# Patient Record
Sex: MALE | NOT HISPANIC OR LATINO | Employment: OTHER | URBAN - METROPOLITAN AREA
[De-identification: names, ages, dates, MRNs, and addresses within clinical notes are randomized per-mention and may not be internally consistent; named-entity substitution may affect disease eponyms.]

---

## 2018-01-11 ENCOUNTER — OUTSIDE FACILITY SERVICE (OUTPATIENT)
Dept: GASTROENTEROLOGY | Facility: CLINIC | Age: 73
End: 2018-01-11

## 2018-01-11 PROCEDURE — G0105 COLORECTAL SCRN; HI RISK IND: HCPCS | Performed by: INTERNAL MEDICINE

## 2019-01-14 ENCOUNTER — OFFICE VISIT (OUTPATIENT)
Dept: RADIATION ONCOLOGY | Facility: HOSPITAL | Age: 74
End: 2019-01-14

## 2019-01-14 ENCOUNTER — HOSPITAL ENCOUNTER (OUTPATIENT)
Dept: RADIATION ONCOLOGY | Facility: HOSPITAL | Age: 74
Setting detail: RADIATION/ONCOLOGY SERIES
Discharge: HOME | End: 2019-01-14

## 2019-01-14 VITALS
OXYGEN SATURATION: 97 % | DIASTOLIC BLOOD PRESSURE: 86 MMHG | WEIGHT: 208.6 LBS | TEMPERATURE: 98.4 F | HEART RATE: 105 BPM | SYSTOLIC BLOOD PRESSURE: 172 MMHG | RESPIRATION RATE: 20 BRPM

## 2019-01-14 DIAGNOSIS — C61 PROSTATE CANCER (HCC): Primary | ICD-10-CM

## 2019-01-14 PROCEDURE — G0463 HOSPITAL OUTPT CLINIC VISIT: HCPCS

## 2019-01-14 RX ORDER — METOPROLOL SUCCINATE 50 MG/1
50 TABLET, EXTENDED RELEASE ORAL DAILY
COMMUNITY

## 2019-01-14 RX ORDER — LEVOTHYROXINE SODIUM 0.1 MG/1
100 TABLET ORAL DAILY
COMMUNITY

## 2019-01-14 NOTE — PROGRESS NOTES
CONSULTATION NOTE      :                                                          1945  DATE OF CONSULTATION:                       2019   REQUESTING PHYSICIAN:                   Franky Villalobos MD  REASON FOR CONSULTATION:        Prostate cancer (CMS/[facility])  Staging form: Prostate, AJCC 8th Edition  - Clinical stage from 2018: Stage I (cT1c, cN0, cM0, PSA: 8.9, Grade Group: 1) - Signed by Kunal Ospina MD on 2019       BRIEF HISTORY:  The patient is a very pleasant 73 y.o. male  with recently diagnosed prostate cancer.    He presented with abnormal JANINE and elevated PSA of 8.94 ng/ml on 2018.    Prostate gland measured 38.8 cc with no suspicious areas on ultrasound.  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  He underwent staging CT abdomen pelvis which showed no obvious extra prostatic extension or pathologic adenopathy.  He did have incidental findings of left inguinal hernia, right renal stone and questionable gallstones.  He recently underwent left inguinal hernia repair and is recovering well from that procedure.  He is now interested in definitive treatment for the prostate cancer.     No Known Allergies    Social History     Socioeconomic History   • Marital status: Unknown     Spouse name: Not on file   • Number of children: Not on file   • Years of education: Not on file   • Highest education level: Not on file   Tobacco Use   • Smoking status: Former Smoker     Packs/day: 0.25     Types: Cigarettes     Last attempt to quit: 1960     Years since quittin.0   • Smokeless tobacco: Never Used   Substance and Sexual Activity   • Alcohol use: Yes     Alcohol/week: 3.0 oz     Types: 5 Cans of beer per week     Comment: 5 per week   • Drug use: No   • Sexual activity: Defer       Past Medical History:    Diagnosis Date   • Arthritis    • Chronic kidney disease    • Disease of thyroid gland    • Gout    • Hypertension    • Prostate cancer (CMS/HCC)        family history includes Heart attack in his mother; Lung cancer in his father; Prostate cancer in his father.     Past Surgical History:   Procedure Laterality Date   • CHOLECYSTECTOMY  2018   • HERNIA REPAIR  2019   • PROSTATE BIOPSY  2018   • TONSILLECTOMY          Review of Systems   Constitutional: Negative.    HENT:   Positive for hearing loss and tinnitus.    Eyes: Negative.    Respiratory: Negative.    Cardiovascular: Negative.    Gastrointestinal: Negative.    Endocrine: Negative.    Genitourinary: Negative.     Musculoskeletal: Positive for back pain.   Skin: Negative.    Neurological: Negative.    Hematological: Bruises/bleeds easily.   Psychiatric/Behavioral: The patient is nervous/anxious.          IPSS Questionnaire (AUA-7):  Over the past month…    1)  Incomplete Emptying  How often have you had a sensation of not emptying your bladder?  0 - Not at all   2)  Frequency  How often have you had to urinate less than every two hours? 1 - Less than 1 time in 5   3)  Intermittency  How often have you found you stopped and started again several times when you urinated?  0 - Not at all   4) Urgency  How often have you found it difficult to postpone urination?  0 - Not at all   5) Weak Stream  How often have you had a weak urinary stream?  0 - Not at all   6) Straining  How often have you had to push or strain to begin urination?  0 - Not at all   7) Nocturia  How many times did you typically get up at night to urinate?  1 - 1 time   Total Score:  2       Quality of life due to urinary symptoms:  If you were to spend the rest of your life with your urinary condition the way it is now, how would you feel about that? 0-Delighted   Urine Leakage (Incontinence) 0-No Leakage     Sexual Health Inventory  Current Status    1)  How do you rate your confidence that you  could achieve and keep an erection? 1-Very Low   2) When you had erections with sexual stimulation, how often were your erections hard enough for penetration (entering your partner)? 0-No sexual activity   3)  During sexual intercourse, how often were you able to maintain your erection after you had penetrated (entered) into your partner? 0-Did not attempt intercourse   4) During sexual intercourse, how difficult was it to maintain your erection to completion of intercourse? 0-Did not attempt intercourse   5) When you attempted sexual intercourse, how often was it satisfactory to you? 0-No sexual activity   Total Score: 1       Bowel Health Inventory  Current Status: 0-No problems, no rectal bleeding, no discharge, less then 5 bowel movements a day               Objective   VITAL SIGNS:   Vitals:    01/14/19 0848   BP: 172/86   Pulse: 105   Resp: 20   Temp: 98.4 °F (36.9 °C)   TempSrc: Temporal   SpO2: 97%   Weight: 94.6 kg (208 lb 9.6 oz)   PainSc: 0-No pain        Karnofsky score: 90        Physical Exam   Constitutional: He is oriented to person, place, and time. He appears well-developed and well-nourished.   HENT:   Head: Normocephalic and atraumatic.   Neck: Normal range of motion. Neck supple.   Cardiovascular: Normal rate, regular rhythm and normal heart sounds.   No murmur heard.  Pulmonary/Chest: Effort normal and breath sounds normal. He has no wheezes. He has no rales.   Abdominal: Soft. Bowel sounds are normal. He exhibits no distension. There is no hepatosplenomegaly. There is no tenderness.   Genitourinary: Rectal exam shows no external hemorrhoid, no internal hemorrhoid, no mass and no tenderness. Prostate is enlarged (30 cc, symmetricwith smooth surface but induration left mid to apex.  Seminal vesicles not palpable.). Prostate is not tender.   Musculoskeletal: Normal range of motion. He exhibits no edema or tenderness.   Lymphadenopathy:     He has no cervical adenopathy.     He has no axillary  adenopathy.        Right: No supraclavicular adenopathy present.        Left: No supraclavicular adenopathy present.   Neurological: He is alert and oriented to person, place, and time. He has normal strength. No sensory deficit.   Skin: Skin is warm and dry.   Psychiatric: He has a normal mood and affect. His behavior is normal. Judgment and thought content normal.   Nursing note and vitals reviewed.           The following portions of the patient's history were reviewed and updated as appropriate: allergies, current medications, past family history, past medical history, past social history, past surgical history and problem list.    Assessment      Prostate cancer, Akanksha's score 3+3 = 6, clinical stage I (T1c, N0, M0) with PSA 8.94 ng/ml.  He has low risk disease but half of the biopsies were involved with neoplasm.    He wishes to pursue definitive treatment.  He is recovering from recent left inguinal hernia repair but is otherwise healthy enough for treatment with life expectancy greater than 10 years.  He is interested in a nonsurgical approach.  We reviewed the radiation options of IMRT, SBRT or brachytherapy.  He would like to proceed with stereotactic body radiotherapy.  The CyberKnife treatment procedure has been reviewed in detail today.  Informed consent obtained.    RECOMMENDATIONS:  He will schedule gold seed fiducial placement with Dr. Villalobos, as soon as he has sufficiently recovered from hernia repair procedure.  He will notify us when this is complete and we will see him back approximately one week later for treatment planning CT and MRI pelvis.  The prostate gland will receive 5 fractions of 7 Gray each on the CyberKnife.    Return in about 4 weeks (around 2/11/2019) for Office Visit, Simulation, CyberKnife treatment.  Lorenzo was seen today for prostate cancer.    Diagnoses and all orders for this visit:    Prostate cancer (CMS/[facility])         Kunal Ospina MD

## 2019-02-11 ENCOUNTER — APPOINTMENT (OUTPATIENT)
Dept: RADIATION ONCOLOGY | Facility: HOSPITAL | Age: 74
End: 2019-02-11

## 2019-03-21 ENCOUNTER — TELEPHONE (OUTPATIENT)
Dept: RADIATION ONCOLOGY | Facility: HOSPITAL | Age: 74
End: 2019-03-21

## 2019-03-21 DIAGNOSIS — C61 PROSTATE CANCER (HCC): Primary | ICD-10-CM

## 2019-03-21 NOTE — TELEPHONE ENCOUNTER
Pt called and states he has marker placement scheduled but he couldn't remember when.  I contacted Dr. Villalobos's office and they confirm pt has markers scheduled on 3/25/19.   Called pt back and informed him of that appt and the following:  On 4/8/19 @ 1030 clinic  @ 1100 CT sim  @ 1130 CK sim  @ 1245 MRI for 1315 scan  Educated pt on the importance of following the ck prostate diet with gas-x 4 times per day starting 4/6/19.  Instructed pt to be NPO the morning of 4/8/19 and to perform an enema prior to leaving home.  Pt verbalized understanding.       Message sent to Tasha Renee RD.

## 2019-04-04 ENCOUNTER — DOCUMENTATION (OUTPATIENT)
Dept: NUTRITION | Facility: HOSPITAL | Age: 74
End: 2019-04-04

## 2019-04-04 NOTE — PROGRESS NOTES
[facility] Nutrition    Prostate Cancer / CK Treatment    Phone call to patient as a review of the gas elimination diet and instructions in preparation for the imaging scans scheduled for 4/8/19.  No answer;  Brief message left with instructions and request for return phone call.  Contact information provided.

## 2019-04-08 ENCOUNTER — OFFICE VISIT (OUTPATIENT)
Dept: RADIATION ONCOLOGY | Facility: HOSPITAL | Age: 74
End: 2019-04-08

## 2019-04-08 ENCOUNTER — HOSPITAL ENCOUNTER (OUTPATIENT)
Dept: RADIATION ONCOLOGY | Facility: HOSPITAL | Age: 74
Discharge: HOME | End: 2019-04-08

## 2019-04-08 ENCOUNTER — HOSPITAL ENCOUNTER (OUTPATIENT)
Dept: RADIATION ONCOLOGY | Facility: HOSPITAL | Age: 74
Setting detail: RADIATION/ONCOLOGY SERIES
Discharge: HOME | End: 2019-04-08

## 2019-04-08 ENCOUNTER — HOSPITAL ENCOUNTER (OUTPATIENT)
Dept: MRI IMAGING | Facility: HOSPITAL | Age: 74
Discharge: HOME | End: 2019-04-08
Admitting: RADIOLOGY

## 2019-04-08 VITALS
TEMPERATURE: 98.1 F | DIASTOLIC BLOOD PRESSURE: 96 MMHG | HEART RATE: 97 BPM | OXYGEN SATURATION: 98 % | WEIGHT: 203.4 LBS | RESPIRATION RATE: 20 BRPM | SYSTOLIC BLOOD PRESSURE: 145 MMHG

## 2019-04-08 DIAGNOSIS — C61 PROSTATE CANCER (HCC): Primary | ICD-10-CM

## 2019-04-08 DIAGNOSIS — C61 PROSTATE CANCER (HCC): ICD-10-CM

## 2019-04-08 PROCEDURE — 72195 MRI PELVIS W/O DYE: CPT

## 2019-04-08 PROCEDURE — 77290 THER RAD SIMULAJ FIELD CPLX: CPT | Performed by: RADIOLOGY

## 2019-04-08 RX ORDER — TAMSULOSIN HYDROCHLORIDE 0.4 MG/1
1 CAPSULE ORAL NIGHTLY
Qty: 30 CAPSULE | Refills: 11 | Status: SHIPPED | OUTPATIENT
Start: 2019-04-08 | End: 2020-12-10

## 2019-04-08 NOTE — PROGRESS NOTES
RE-EVALUATION    PATIENT:                                                      Lorenzo River  :                                                          1945  DATE:                          2019   DIAGNOSIS:     Prostate Cancer  Cancer Staging  Stage I (cT1c, cN0, cM0, PSA: 8.9, Grade Group: 1)       BRIEF HISTORY:  The patient is a very pleasant 73 y.o. male  with a low risk prostate cancer.  He chose to undergo definitive treatment with stereotactic body radiotherapy.  He tolerated gold seed fiducial placement by Dr. Goss very well.  He had no significant hematuria, dysuria or other change in voiding.  He has had no other change in health since informed consent was obtained on 2019.  He remains a good candidate for SBRT.    No Known Allergies    Review of Systems   Constitutional: Negative.    HENT:  Negative.    Eyes: Negative.    Respiratory: Negative.    Cardiovascular: Negative.    Gastrointestinal: Negative.    Endocrine: Negative.    Genitourinary: Negative.     Musculoskeletal: Negative.    Skin: Negative.    Neurological: Negative.    Hematological: Negative.    Psychiatric/Behavioral: Negative.            IPSS Questionnaire (AUA-7):  Over the past month…     1)  Incomplete Emptying  How often have you had a sensation of not emptying your bladder?  0 - Not at all   2)  Frequency  How often have you had to urinate less than every two hours? 1 - Less than 1 time in 5   3)  Intermittency  How often have you found you stopped and started again several times when you urinated?  0 - Not at all   4) Urgency  How often have you found it difficult to postpone urination?  0 - Not at all   5) Weak Stream  How often have you had a weak urinary stream?  0 - Not at all   6) Straining  How often have you had to push or strain to begin urination?  0 - Not at all   7) Nocturia  How many times did you typically get up at night to urinate?  1 - 1 time   Total Score:  2         Quality of life due to  urinary symptoms:  If you were to spend the rest of your life with your urinary condition the way it is now, how would you feel about that? 0-Delighted   Urine Leakage (Incontinence) 0-No Leakage      Sexual Health Inventory  Current Status     1)  How do you rate your confidence that you could achieve and keep an erection? 1-Very Low   2) When you had erections with sexual stimulation, how often were your erections hard enough for penetration (entering your partner)? 0-No sexual activity   3)  During sexual intercourse, how often were you able to maintain your erection after you had penetrated (entered) into your partner? 0-Did not attempt intercourse   4) During sexual intercourse, how difficult was it to maintain your erection to completion of intercourse? 0-Did not attempt intercourse   5) When you attempted sexual intercourse, how often was it satisfactory to you? 0-No sexual activity   Total Score: 1         Bowel Health Inventory  Current Status: 0-No problems, no rectal bleeding, no discharge, less then 5 bowel movements a day                      Objective   VITAL SIGNS:   Vitals:    04/08/19 1038   BP: 145/96   Pulse: 97   Resp: 20   Temp: 98.1 °F (36.7 °C)   TempSrc: Temporal   SpO2: 98%   Weight: 92.3 kg (203 lb 6.4 oz)   PainSc: 0-No pain        KPS 90%    Physical Exam         The following portions of the patient's history were reviewed and updated as appropriate: allergies, current medications, past family history, past medical history, past social history, past surgical history and problem list.    Diagnoses and all orders for this visit:    Prostate cancer (CMS/[facility])    Other orders  -     tamsulosin (FLOMAX) 0.4 MG capsule 24 hr capsule; Take 1 capsule by mouth Every Night.      IMPRESSION:  Prostate cancer, Sanford's score 3+3 = 6, clinical stage I (T1c, N0, M0) with PSA 8.94 ng/ml.    RECOMMENDATIONS: Treatment planning CT and MRI pelvis today.  The prostate gland will receive 5 fractions of 7 Gy  each on the CyberKnife.  He will resume the prep, diet and begin Flomax 2 days prior to treatment.         Kunal Ospina MD

## 2019-04-18 PROCEDURE — 77300 RADIATION THERAPY DOSE PLAN: CPT | Performed by: RADIOLOGY

## 2019-04-18 PROCEDURE — 77295 3-D RADIOTHERAPY PLAN: CPT | Performed by: RADIOLOGY

## 2019-04-18 PROCEDURE — 77334 RADIATION TREATMENT AID(S): CPT | Performed by: RADIOLOGY

## 2019-05-01 ENCOUNTER — HOSPITAL ENCOUNTER (OUTPATIENT)
Dept: RADIATION ONCOLOGY | Facility: HOSPITAL | Age: 74
Setting detail: RADIATION/ONCOLOGY SERIES
Discharge: HOME | End: 2019-05-01

## 2019-05-02 ENCOUNTER — HOSPITAL ENCOUNTER (OUTPATIENT)
Dept: RADIATION ONCOLOGY | Facility: HOSPITAL | Age: 74
Discharge: HOME | End: 2019-05-02

## 2019-05-02 PROCEDURE — 77373 STRTCTC BDY RAD THER TX DLVR: CPT | Performed by: RADIOLOGY

## 2019-05-02 PROCEDURE — 77280 THER RAD SIMULAJ FIELD SMPL: CPT | Performed by: RADIOLOGY

## 2019-05-03 ENCOUNTER — HOSPITAL ENCOUNTER (OUTPATIENT)
Dept: RADIATION ONCOLOGY | Facility: HOSPITAL | Age: 74
Discharge: HOME | End: 2019-05-03

## 2019-05-03 PROCEDURE — 77373 STRTCTC BDY RAD THER TX DLVR: CPT | Performed by: RADIOLOGY

## 2019-05-06 ENCOUNTER — HOSPITAL ENCOUNTER (OUTPATIENT)
Dept: RADIATION ONCOLOGY | Facility: HOSPITAL | Age: 74
Discharge: HOME | End: 2019-05-06

## 2019-05-06 PROCEDURE — 77373 STRTCTC BDY RAD THER TX DLVR: CPT | Performed by: RADIOLOGY

## 2019-05-07 ENCOUNTER — HOSPITAL ENCOUNTER (OUTPATIENT)
Dept: RADIATION ONCOLOGY | Facility: HOSPITAL | Age: 74
Discharge: HOME | End: 2019-05-07

## 2019-05-07 PROCEDURE — 77373 STRTCTC BDY RAD THER TX DLVR: CPT | Performed by: RADIOLOGY

## 2019-05-08 ENCOUNTER — HOSPITAL ENCOUNTER (OUTPATIENT)
Dept: RADIATION ONCOLOGY | Facility: HOSPITAL | Age: 74
Discharge: HOME | End: 2019-05-08

## 2019-05-08 PROCEDURE — 77373 STRTCTC BDY RAD THER TX DLVR: CPT | Performed by: RADIOLOGY

## 2019-05-08 PROCEDURE — 77336 RADIATION PHYSICS CONSULT: CPT | Performed by: RADIOLOGY

## 2019-06-04 ENCOUNTER — HOSPITAL ENCOUNTER (OUTPATIENT)
Dept: RADIATION ONCOLOGY | Facility: HOSPITAL | Age: 74
Setting detail: RADIATION/ONCOLOGY SERIES
Discharge: HOME | End: 2019-06-04

## 2019-06-04 ENCOUNTER — OFFICE VISIT (OUTPATIENT)
Dept: RADIATION ONCOLOGY | Facility: HOSPITAL | Age: 74
End: 2019-06-04

## 2019-06-04 VITALS
WEIGHT: 206 LBS | SYSTOLIC BLOOD PRESSURE: 148 MMHG | BODY MASS INDEX: 27.94 KG/M2 | TEMPERATURE: 97.8 F | RESPIRATION RATE: 20 BRPM | DIASTOLIC BLOOD PRESSURE: 72 MMHG | HEART RATE: 81 BPM | OXYGEN SATURATION: 97 %

## 2019-06-04 DIAGNOSIS — C61 PROSTATE CANCER (HCC): Primary | ICD-10-CM

## 2019-06-04 PROCEDURE — G0463 HOSPITAL OUTPT CLINIC VISIT: HCPCS

## 2019-06-04 NOTE — PROGRESS NOTES
FOLLOW UP NOTE    PATIENT:                                                      Lorenzo River  MEDICAL RECORD #:                        [id number]  :                                                          1945  COMPLETION DATE:   19  DIAGNOSIS:     Prostate cancer (CMS/[facility])  Staging form: Prostate, AJCC 8th Edition  - Clinical stage from 2018: Stage I (cT1c, cN0, cM0, PSA: 8.9, Grade Group: 1) - Signed by Kunal Ospina MD on 2019    BRIEF HISTORY:  Initial follow-up visit after CyberKnife SBRT for low risk prostate cancer.  He tolerated treatment well with minimal symptoms.  He experienced fatigue which lasted approximately 1 week.  He had no significant dysuria or frequency.  He has had some increase in chronic nocturia from 0-1 before SBRT to maximum 3 episodes currently.  He continues to use Flomax.  He had no bowel disturbance.  He has not yet had urologic follow-up or PSA testing but is scheduled to return to Dr. Goss in August.    MEDICATIONS: Medication reconciliation for the patient was reviewed and confirmed in the electronic medical record.    Review of Systems   Constitutional: Positive for fatigue.   HENT:   Positive for hearing loss.    Eyes: Negative.    Respiratory: Negative.    Cardiovascular: Negative.    Gastrointestinal: Negative.    Endocrine: Negative.    Genitourinary: Positive for frequency.    Musculoskeletal: Positive for back pain.   Skin: Negative.    Neurological: Negative.    Hematological: Bruises/bleeds easily.   Psychiatric/Behavioral: Negative.        IPSS Questionnaire (AUA-7):  Over the past month…     1)  Incomplete Emptying  How often have you had a sensation of not emptying your bladder?  0 - Not at all   2)  Frequency  How often have you had to urinate less than every two hours? 1 - Less than 1 time in 5   3)  Intermittency  How often have you found you stopped and started again several times when you urinated?  0 - Not at all   4) Urgency  How  often have you found it difficult to postpone urination?  0 - Not at all   5) Weak Stream  How often have you had a weak urinary stream?  0 - Not at all   6) Straining  How often have you had to push or strain to begin urination?  0 - Not at all   7) Nocturia  How many times did you typically get up at night to urinate?  2   Total Score:  3         Quality of life due to urinary symptoms:  If you were to spend the rest of your life with your urinary condition the way it is now, how would you feel about that? 0-Delighted   Urine Leakage (Incontinence) 0-No Leakage      Sexual Health Inventory  Current Status     1)  How do you rate your confidence that you could achieve and keep an erection? 1-Very Low   2) When you had erections with sexual stimulation, how often were your erections hard enough for penetration (entering your partner)? 0-No sexual activity   3)  During sexual intercourse, how often were you able to maintain your erection after you had penetrated (entered) into your partner? 0-Did not attempt intercourse   4) During sexual intercourse, how difficult was it to maintain your erection to completion of intercourse? 0-Did not attempt intercourse   5) When you attempted sexual intercourse, how often was it satisfactory to you? 0-No sexual activity   Total Score: 1         Bowel Health Inventory  Current Status: 0-No problems, no rectal bleeding, no discharge, less then 5 bowel movements a day                  KPS 80%    Physical Exam   Constitutional: He is oriented to person, place, and time. He appears well-developed and well-nourished.   HENT:   Head: Normocephalic and atraumatic.   Neck: Normal range of motion. Neck supple.   Cardiovascular: Normal rate, regular rhythm and normal heart sounds.   No murmur heard.  Pulmonary/Chest: Effort normal and breath sounds normal. He has no wheezes. He has no rales.   Abdominal: Soft. Bowel sounds are normal. He exhibits no distension. There is no  hepatosplenomegaly. There is no tenderness.   Musculoskeletal: Normal range of motion. He exhibits no edema or tenderness.   Lymphadenopathy:     He has no cervical adenopathy.     He has no axillary adenopathy.        Right: No supraclavicular adenopathy present.        Left: No supraclavicular adenopathy present.   Neurological: He is alert and oriented to person, place, and time. He has normal strength. No sensory deficit.   Skin: Skin is warm and dry.   Psychiatric: He has a normal mood and affect. His behavior is normal. Judgment and thought content normal.   Nursing note and vitals reviewed.      VITAL SIGNS:   Vitals:    06/04/19 1132   BP: 148/72   Pulse: 81   Resp: 20   Temp: 97.8 °F (36.6 °C)   TempSrc: Oral   SpO2: 97%   Weight: 93.4 kg (206 lb)   PainSc: 0-No pain       The following portions of the patient's history were reviewed and updated as appropriate: allergies, current medications, past family history, past medical history, past social history, past surgical history and problem list.         Lorenzo was seen today for prostate cancer.    Diagnoses and all orders for this visit:    Prostate cancer (CMS/[facility])         IMPRESSION:  Prostate cancer, Akanksha's score 3+3 = 6, clinical stage I (T1c, N0, M0) with PSA 8.94 ng/ml.  He tolerated CyberKnife SBRT very well.    RECOMMENDATIONS: He plans to continue urologic surveillance in the care of Dr. Villalobos.    Return in about 6 months (around 12/4/2019) for Office Visit.    Kunal Ospina MD    Errors in dictation may reflect use of voice recognition software and not all errors in transcription may have been detected prior to signing.

## 2019-12-06 ENCOUNTER — HOSPITAL ENCOUNTER (OUTPATIENT)
Dept: RADIATION ONCOLOGY | Facility: HOSPITAL | Age: 74
Setting detail: RADIATION/ONCOLOGY SERIES
Discharge: HOME | End: 2019-12-06

## 2019-12-06 ENCOUNTER — OFFICE VISIT (OUTPATIENT)
Dept: RADIATION ONCOLOGY | Facility: HOSPITAL | Age: 74
End: 2019-12-06

## 2019-12-06 VITALS
SYSTOLIC BLOOD PRESSURE: 158 MMHG | TEMPERATURE: 98.2 F | HEART RATE: 106 BPM | OXYGEN SATURATION: 96 % | WEIGHT: 207.2 LBS | DIASTOLIC BLOOD PRESSURE: 86 MMHG | BODY MASS INDEX: 28.1 KG/M2 | RESPIRATION RATE: 20 BRPM

## 2019-12-06 DIAGNOSIS — C61 PROSTATE CANCER (HCC): Primary | ICD-10-CM

## 2019-12-06 PROCEDURE — G0463 HOSPITAL OUTPT CLINIC VISIT: HCPCS

## 2019-12-06 NOTE — PROGRESS NOTES
FOLLOW UP NOTE    PATIENT:                                                      Lorenzo River  MEDICAL RECORD #:                        [id number]  :                                                          1945  COMPLETION DATE:   2019  DIAGNOSIS:     Prostate cancer (CMS/[facility])  Staging form: Prostate, AJCC 8th Edition  - Clinical stage from 2018: Stage I (cT1c, cN0, cM0, PSA: 8.9, Grade Group: 1) - Signed by Kunal Ospina MD on 2019      BRIEF HISTORY:    Routine follow-up visit.  He has history of low risk prostate cancer treated with CyberKnife SBRT.  He tolerated treatment well with brief transient urinary symptoms which have resolved.  He is voiding well.  He has discontinued use of alpha-blocker.  Bowels are normal.  Energy is good and he has no limitations.  No new aches or pains.  PSA had decreased to a value 2.25 ng/ml on 2019.    MEDICATIONS: Medication reconciliation for the patient was reviewed and confirmed in the electronic medical record.    Review of Systems   Constitutional: Negative.    HENT:  Negative.    Eyes: Negative.    Respiratory: Negative.    Cardiovascular: Negative.    Gastrointestinal: Negative.    Endocrine: Negative.    Genitourinary: Negative.     Musculoskeletal: Positive for arthralgias and back pain.   Skin: Negative.    Neurological: Negative.    Hematological: Negative.    Psychiatric/Behavioral: Negative.          IPSS Questionnaire (AUA-7):  Over the past month…     1)  Incomplete Emptying  How often have you had a sensation of not emptying your bladder?  0 - Not at all   2)  Frequency  How often have you had to urinate less than every two hours? 1 - Less than 1 time in 5   3)  Intermittency  How often have you found you stopped and started again several times when you urinated?  0 - Not at all   4) Urgency  How often have you found it difficult to postpone urination?  0 - Not at all   5) Weak Stream  How often have you had a weak urinary  stream?  0 - Not at all   6) Straining  How often have you had to push or strain to begin urination?  0 - Not at all   7) Nocturia  How many times did you typically get up at night to urinate?  1 - 1 time   Total Score:  2         Quality of life due to urinary symptoms:  If you were to spend the rest of your life with your urinary condition the way it is now, how would you feel about that? 0-Delighted   Urine Leakage (Incontinence) 0-No Leakage      Sexual Health Inventory  Current Status     1)  How do you rate your confidence that you could achieve and keep an erection? 1-Very Low   2) When you had erections with sexual stimulation, how often were your erections hard enough for penetration (entering your partner)? 0-No sexual activity   3)  During sexual intercourse, how often were you able to maintain your erection after you had penetrated (entered) into your partner? 0-Did not attempt intercourse   4) During sexual intercourse, how difficult was it to maintain your erection to completion of intercourse? 0-Did not attempt intercourse   5) When you attempted sexual intercourse, how often was it satisfactory to you? 0-No sexual activity   Total Score: 1         Bowel Health Inventory  Current Status: 0-No problems, no rectal bleeding, no discharge, less then 5 bowel movements a day                  KPS 80%    Physical Exam   Constitutional: He is oriented to person, place, and time. He appears well-developed and well-nourished.   HENT:   Head: Normocephalic and atraumatic.   Neck: Normal range of motion. Neck supple.   Cardiovascular:   No murmur heard.  Irregularly irregular heart rate initially on exam, converting to regular rate and rhythm at completion of visit, prior to leaving the department.   Pulmonary/Chest: Effort normal and breath sounds normal. He has no wheezes. He has no rales.   Abdominal: Soft. Bowel sounds are normal. He exhibits no distension. There is no hepatosplenomegaly. There is no  tenderness.   Genitourinary: Rectal exam shows no external hemorrhoid, no internal hemorrhoid, no fissure, no mass, no tenderness and anal tone normal. Prostate is enlarged ( 20 cc, smooth, soft, no nodules or induration.  Seminal vesicles not palpable.  (Pretreatment exam revealed a 30 to 40 cc enlarged prostate with induration on the left which has resolved)). Prostate is not tender.   Musculoskeletal: Normal range of motion. He exhibits no edema or tenderness.   Lymphadenopathy:     He has no cervical adenopathy.     He has no axillary adenopathy.        Right: No supraclavicular adenopathy present.        Left: No supraclavicular adenopathy present.   Neurological: He is alert and oriented to person, place, and time. He has normal strength. No sensory deficit.   Skin: Skin is warm and dry.   Psychiatric: He has a normal mood and affect. His behavior is normal. Judgment and thought content normal.   Nursing note and vitals reviewed.      VITAL SIGNS:   Vitals:    12/06/19 1007   BP: 158/86   Pulse: 106   Resp: 20   Temp: 98.2 °F (36.8 °C)   TempSrc: Temporal   SpO2: 96%   Weight: 94 kg (207 lb 3.2 oz)   PainSc: 0-No pain       The following portions of the patient's history were reviewed and updated as appropriate: allergies, current medications, past family history, past medical history, past social history, past surgical history and problem list.         Lorenzo was seen today for prostate cancer.    Diagnoses and all orders for this visit:    Prostate cancer (CMS/[facility])         IMPRESSION:  Prostate cancer, [place]'s score 3+3 = 6, clinical stage I (T1c, N0, M0) with PSA 8.94 ng/ml.  7 months status post CyberKnife SBRT.  Excellent tolerance of treatment.  Very good early partial biochemical response and clinical response with downsizing of the prostate gland and resolution of previously noted induration in the left lobe of the prostate gland.  Prognosis remains excellent.    Transient atrial fibrillation noted as  a new finding, not present in his history.    RECOMMENDATIONS: He plans to continue urologic follow-up under the care of Dr. Villalobos.  He has return appointment and repeat PSA testing February 28, 2020.  He will notify us of the PSA results.    I would like to see him back for one additional follow-up visit in approximately 1 year, at which time he should achieve nolberto PSA value.    Patient will contact his primary care physician, Dr. Jaramillo, for evaluation of incidentally found asymptomatic, transient, atrial fibrillation noted during this visit.    Return in about 1 year (around 12/6/2020), or if symptoms worsen or fail to improve.    Kunal Ospina MD    Errors in dictation may reflect use of voice recognition software and not all errors in transcription may have been detected prior to signing.

## 2020-12-10 ENCOUNTER — HOSPITAL ENCOUNTER (OUTPATIENT)
Dept: RADIATION ONCOLOGY | Facility: HOSPITAL | Age: 75
Setting detail: RADIATION/ONCOLOGY SERIES
Discharge: HOME | End: 2020-12-10

## 2020-12-10 ENCOUNTER — OFFICE VISIT (OUTPATIENT)
Dept: RADIATION ONCOLOGY | Facility: HOSPITAL | Age: 75
End: 2020-12-10

## 2020-12-10 VITALS
HEIGHT: 72 IN | RESPIRATION RATE: 16 BRPM | TEMPERATURE: 96.6 F | HEART RATE: 94 BPM | BODY MASS INDEX: 29.07 KG/M2 | OXYGEN SATURATION: 96 % | WEIGHT: 214.6 LBS | DIASTOLIC BLOOD PRESSURE: 65 MMHG | SYSTOLIC BLOOD PRESSURE: 110 MMHG

## 2020-12-10 DIAGNOSIS — C61 PROSTATE CANCER (HCC): Primary | ICD-10-CM

## 2020-12-10 PROCEDURE — G0463 HOSPITAL OUTPT CLINIC VISIT: HCPCS

## 2020-12-10 RX ORDER — GABAPENTIN 300 MG/1
CAPSULE ORAL EVERY 8 HOURS SCHEDULED
COMMUNITY

## 2020-12-10 RX ORDER — ALLOPURINOL 100 MG/1
TABLET ORAL
COMMUNITY

## 2020-12-10 RX ORDER — WARFARIN SODIUM 3 MG/1
TABLET ORAL
COMMUNITY

## 2020-12-10 NOTE — PROGRESS NOTES
FOLLOW UP NOTE    PATIENT:                                                      Lorenzo River  MEDICAL RECORD #:                        [id number]  :                                                          1945  COMPLETION DATE:   2019  DIAGNOSIS:     Prostate cancer (CMS/[facility])  - Stage I (cT1c, cN0, cM0, PSA: 8.9, Grade Group: 1)      BRIEF HISTORY:    Routine follow-up visit.  He has a history of low risk prostate cancer treated with stereotactic body radiotherapy.  He is doing extremely well with no urinary tract complaints.  He is not requiring use of alpha-blocker.  He has no bowel problems other than occasional chronic constipation.  No hematochezia.  He has chronic low back pain but no significant change.  He has had some difficulty optimizing anticoagulation therapy but is otherwise doing well.  PSA value 0.28 ng/ml on 2020.    MEDICATIONS: Medication reconciliation for the patient was reviewed and confirmed in the electronic medical record.    Review of Systems   Constitutional: Positive for fatigue.   Respiratory: Positive for cough.    Musculoskeletal: Positive for back pain and neck pain.   Hematological: Bruises/bleeds easily.         IPSS Questionnaire (AUA-7):  Over the past month…     1)  Incomplete Emptying  How often have you had a sensation of not emptying your bladder?  0 - Not at all   2)  Frequency  How often have you had to urinate less than every two hours? 1 - Less than 1 time in 5   3)  Intermittency  How often have you found you stopped and started again several times when you urinated?  0 - Not at all   4) Urgency  How often have you found it difficult to postpone urination?  0 - Not at all   5) Weak Stream  How often have you had a weak urinary stream?  0 - Not at all   6) Straining  How often have you had to push or strain to begin urination?  0 - Not at all   7) Nocturia  How many times did you typically get up at night to urinate?  1 - 1 time   Total Score:  2          Quality of life due to urinary symptoms:  If you were to spend the rest of your life with your urinary condition the way it is now, how would you feel about that? 0-Delighted   Urine Leakage (Incontinence) 0-No Leakage      Sexual Health Inventory  Current Status     1)  How do you rate your confidence that you could achieve and keep an erection? 1-Very Low   2) When you had erections with sexual stimulation, how often were your erections hard enough for penetration (entering your partner)? 0-No sexual activity   3)  During sexual intercourse, how often were you able to maintain your erection after you had penetrated (entered) into your partner? 0-Did not attempt intercourse   4) During sexual intercourse, how difficult was it to maintain your erection to completion of intercourse? 0-Did not attempt intercourse   5) When you attempted sexual intercourse, how often was it satisfactory to you? 0-No sexual activity   Total Score: 1         Bowel Health Inventory  Current Status: 0-No problems, no rectal bleeding, no discharge, less then 5 bowel movements a day                  KPS 80%    Physical Exam  Vitals signs and nursing note reviewed.   Constitutional:       Appearance: He is well-developed.   HENT:      Head: Normocephalic and atraumatic.   Neck:      Musculoskeletal: Normal range of motion and neck supple.   Cardiovascular:      Rate and Rhythm: Normal rate and regular rhythm.      Heart sounds: Normal heart sounds. No murmur.   Pulmonary:      Effort: Pulmonary effort is normal.      Breath sounds: Normal breath sounds. No wheezing or rales.   Abdominal:      General: Bowel sounds are normal. There is no distension.      Palpations: Abdomen is soft.      Tenderness: There is no abdominal tenderness.   Genitourinary:     Prostate: Not enlarged ( Flat, smooth, small, no nodules or induration.), not tender and no nodules present.      Rectum: No mass, tenderness, external hemorrhoid or internal hemorrhoid.  "Normal anal tone.   Musculoskeletal: Normal range of motion.         General: No tenderness.      Right lower leg: Edema present.      Left lower leg: Edema present.   Lymphadenopathy:      Cervical: No cervical adenopathy.      Upper Body:      Right upper body: No supraclavicular adenopathy.      Left upper body: No supraclavicular adenopathy.   Skin:     General: Skin is warm and dry.   Neurological:      Mental Status: He is alert and oriented to person, place, and time.      Sensory: No sensory deficit.   Psychiatric:         Behavior: Behavior normal.         Thought Content: Thought content normal.         Judgment: Judgment normal.         VITAL SIGNS:   Vitals:    12/10/20 1401   BP: 110/65   Pulse: 94   Resp: 16   Temp: 96.6 °F (35.9 °C)   TempSrc: Temporal   SpO2: 96%  Comment: RA   Weight: 97.3 kg (214 lb 9.6 oz)   Height: 182.9 cm (72\")   PainSc: 0-No pain       The following portions of the patient's history were reviewed and updated as appropriate: allergies, current medications, past family history, past medical history, past social history, past surgical history and problem list.         Diagnoses and all orders for this visit:    1. Prostate cancer (CMS/[facility]) (Primary)         IMPRESSION:  Prostate cancer, [place]'s score 3+3 = 6, clinical stage I (T1c, N0, M0) with PSA 8.94 ng/ml.  He is now more than 1/2 years status post CyberKnife stereotactic body radiotherapy.  He tolerated SBRT treatment very well.  He is in clinical/biochemical disease remission status with PSA nolberto reaching a value of less than 0.5 ng/ml.    Prognosis is excellent.    RECOMMENDATIONS: He plans to continue urology surveillance with PSA testing under the care of Dr. Villalobos.  I will be happy to see him as needed.    Return if symptoms worsen or fail to improve.    Kunal Ospina MD      "